# Patient Record
Sex: MALE | Race: WHITE | NOT HISPANIC OR LATINO | URBAN - METROPOLITAN AREA
[De-identification: names, ages, dates, MRNs, and addresses within clinical notes are randomized per-mention and may not be internally consistent; named-entity substitution may affect disease eponyms.]

---

## 2023-06-13 ENCOUNTER — OFFICE VISIT (OUTPATIENT)
Dept: PEDIATRICS | Facility: CLINIC | Age: 18
End: 2023-06-13
Payer: COMMERCIAL

## 2023-06-13 VITALS — WEIGHT: 160.2 LBS | TEMPERATURE: 98.4 F

## 2023-06-13 DIAGNOSIS — L25.5 RHUS DERMATITIS: Primary | ICD-10-CM

## 2023-06-13 PROCEDURE — 99213 OFFICE O/P EST LOW 20 MIN: CPT | Performed by: PEDIATRICS

## 2023-06-13 RX ORDER — PREDNISONE 20 MG/1
TABLET ORAL
Qty: 25 TABLET | Refills: 0 | Status: SHIPPED | OUTPATIENT
Start: 2023-06-13 | End: 2023-06-27

## 2023-06-13 RX ORDER — CLOBETASOL PROPIONATE 0.5 MG/G
CREAM TOPICAL 2 TIMES DAILY
Qty: 60 G | Refills: 0 | Status: SHIPPED | OUTPATIENT
Start: 2023-06-13

## 2023-06-13 NOTE — PROGRESS NOTES
Subjective   Eliazar Toribio is a 18 y.o. male who presents for Poison Ivy (Here for poison ivy, legs and arms, by himself).    Leaving for [place] for the summer in 3 days.  Just started with rash the past 24 hours.  Historically it gets very bad.  Was hiking the day before.   Very itchy.  On arms nad legs only at this time.    Objective   Temp 36.9 °C (98.4 °F)   Wt 72.7 kg (160 lb 3.2 oz)     Physical Exam  GENERAL:  well appearing, in no acute distress  HEAD:  NCAT  EYES:  EOMI  NOSE:  midline  CARDIAC:  no cyanosis  PULMONARY:   normal respiratory effort   SKIN:  warm and well perfused;  red streaks and clusters, partially raised on legs and arms.    Assessment/Plan   Problem List Items Addressed This Visit    None  Visit Diagnoses       Rhus dermatitis    -  Primary    Relevant Medications    clobetasol (Temovate) 0.05 % cream    predniSONE (Deltasone) 20 mg tablet         Currently a milder case of suspected poison ivy.  However, it is early and he has history of bad clinical courses.  Discussed tx options, including nothing.  Will start topical steroid bid for now.  If this is not resulting in enough relief, switch to the oral steroid.